# Patient Record
Sex: MALE | Race: WHITE | NOT HISPANIC OR LATINO | URBAN - METROPOLITAN AREA
[De-identification: names, ages, dates, MRNs, and addresses within clinical notes are randomized per-mention and may not be internally consistent; named-entity substitution may affect disease eponyms.]

---

## 2019-08-16 NOTE — PATIENT DISCUSSION
Also, please do not hesitate to call us if you have any concerns not addressed by this information. Please call [phone] and we will do everything we can to help you during this [email or web address]

## 2022-06-06 ENCOUNTER — CONSULTATION/EVALUATION (OUTPATIENT)
Dept: URBAN - METROPOLITAN AREA CLINIC 29 | Facility: CLINIC | Age: 47
End: 2022-06-06

## 2022-06-06 ENCOUNTER — NEW PATIENT (OUTPATIENT)
Dept: URBAN - METROPOLITAN AREA CLINIC 31 | Facility: CLINIC | Age: 47
End: 2022-06-06

## 2022-06-06 DIAGNOSIS — H57.13: ICD-10-CM

## 2022-06-06 DIAGNOSIS — H16.8: ICD-10-CM

## 2022-06-06 PROCEDURE — 99203 OFFICE O/P NEW LOW 30 MIN: CPT

## 2022-06-06 ASSESSMENT — VISUAL ACUITY
OD_CC: CF 1FT
OS_CC: 20/50
OS_CC: 20/30

## 2022-06-07 ENCOUNTER — FOLLOW UP (OUTPATIENT)
Dept: URBAN - METROPOLITAN AREA CLINIC 29 | Facility: CLINIC | Age: 47
End: 2022-06-07

## 2022-06-07 DIAGNOSIS — H16.8: ICD-10-CM

## 2022-06-07 PROCEDURE — 92012 INTRM OPH EXAM EST PATIENT: CPT

## 2022-06-07 ASSESSMENT — VISUAL ACUITY
OS_CC: 20/60
OD_CC: CF 2FT
OS_PH: 20/50+2

## 2022-06-09 ENCOUNTER — FOLLOW UP (OUTPATIENT)
Dept: URBAN - METROPOLITAN AREA CLINIC 34 | Facility: CLINIC | Age: 47
End: 2022-06-09

## 2022-06-09 DIAGNOSIS — H16.8: ICD-10-CM

## 2022-06-09 PROCEDURE — 99213 OFFICE O/P EST LOW 20 MIN: CPT

## 2022-06-09 ASSESSMENT — VISUAL ACUITY
OD_CC: 20/30-2
OS_CC: 20/30

## 2022-06-14 ENCOUNTER — FOLLOW UP (OUTPATIENT)
Dept: URBAN - METROPOLITAN AREA CLINIC 29 | Facility: CLINIC | Age: 47
End: 2022-06-14

## 2022-06-14 DIAGNOSIS — H16.8: ICD-10-CM

## 2022-06-14 PROCEDURE — 92012 INTRM OPH EXAM EST PATIENT: CPT

## 2022-06-14 ASSESSMENT — VISUAL ACUITY
OD_SC: 20/40+2
OS_SC: 20/30-1

## 2022-06-22 ENCOUNTER — FOLLOW UP (OUTPATIENT)
Dept: URBAN - METROPOLITAN AREA CLINIC 29 | Facility: CLINIC | Age: 47
End: 2022-06-22

## 2022-06-22 DIAGNOSIS — H16.8: ICD-10-CM

## 2022-06-22 PROCEDURE — 99213 OFFICE O/P EST LOW 20 MIN: CPT

## 2022-06-22 ASSESSMENT — VISUAL ACUITY
OS_CC: 20/40
OS_PH: 20/25
OD_PH: 20/20
OD_CC: 20/40

## 2022-07-15 ENCOUNTER — FOLLOW UP (OUTPATIENT)
Dept: URBAN - METROPOLITAN AREA CLINIC 29 | Facility: CLINIC | Age: 47
End: 2022-07-15

## 2022-07-15 DIAGNOSIS — H17.9: ICD-10-CM

## 2022-07-15 PROCEDURE — 99213 OFFICE O/P EST LOW 20 MIN: CPT

## 2022-07-15 PROCEDURE — 92025 CPTRIZED CORNEAL TOPOGRAPHY: CPT

## 2022-07-15 ASSESSMENT — VISUAL ACUITY
OS_SC: 20/200
OS_CC: 20/40
OD_SC: 20/200
OD_SC: 20/20
OD_CC: 20/40
OS_SC: 20/20
OD_CC: 20/20-1
OS_CC: 20/20

## 2023-01-09 ENCOUNTER — CONSULTATION/EVALUATION (OUTPATIENT)
Dept: URBAN - METROPOLITAN AREA CLINIC 29 | Facility: CLINIC | Age: 48
End: 2023-01-09

## 2023-01-09 DIAGNOSIS — H52.13: ICD-10-CM

## 2023-01-09 DIAGNOSIS — H17.9: ICD-10-CM

## 2023-01-09 PROCEDURE — 99499LK REFRACTIVE CONSULT/LASIK

## 2023-01-09 PROCEDURE — 76514 ECHO EXAM OF EYE THICKNESS: CPT

## 2023-01-09 PROCEDURE — 92025 CPTRIZED CORNEAL TOPOGRAPHY: CPT

## 2023-01-09 ASSESSMENT — VISUAL ACUITY
OD_SC: 20/100-2
OD_CC: 20/20
OS_SC: 20/200
OS_CC: 20/20

## 2023-01-09 ASSESSMENT — PACHYMETRY
OS_CT_UM: 578
OD_CT_UM: 524

## 2023-01-09 ASSESSMENT — TONOMETRY
OD_IOP_MMHG: 16
OS_IOP_MMHG: 13

## 2023-04-01 ENCOUNTER — POST-OP (OUTPATIENT)
Dept: URBAN - METROPOLITAN AREA CLINIC 29 | Facility: CLINIC | Age: 48
End: 2023-04-01

## 2023-04-01 DIAGNOSIS — Z98.890: ICD-10-CM

## 2023-04-01 DIAGNOSIS — H17.9: ICD-10-CM

## 2023-04-01 PROCEDURE — 6699955 NON-COMANAGED LASIK PO

## 2023-04-01 ASSESSMENT — VISUAL ACUITY
OD_SC: 20/40
OS_SC: 20/30

## 2023-04-05 ENCOUNTER — POST-OP (OUTPATIENT)
Dept: URBAN - METROPOLITAN AREA CLINIC 29 | Facility: CLINIC | Age: 48
End: 2023-04-05

## 2023-04-05 DIAGNOSIS — Z98.890: ICD-10-CM

## 2023-04-05 PROCEDURE — 6699955 NON-COMANAGED LASIK PO

## 2023-04-05 ASSESSMENT — VISUAL ACUITY
OS_SC: 20/20-2
OS_SC: 20/25-2
OD_SC: 20/20-1

## 2023-05-05 ENCOUNTER — POST-OP (OUTPATIENT)
Dept: URBAN - METROPOLITAN AREA CLINIC 29 | Facility: CLINIC | Age: 48
End: 2023-05-05

## 2023-05-05 DIAGNOSIS — Z98.890: ICD-10-CM

## 2023-05-05 PROCEDURE — 66999PO NON CO-MANAGED OTHER SURGERY PO

## 2023-05-05 ASSESSMENT — VISUAL ACUITY
OS_SC: 20/20
OS_SC: 20/50
OD_SC: 20/20-1
OD_SC: 20/70

## 2023-07-11 ENCOUNTER — POST-OP (OUTPATIENT)
Dept: URBAN - METROPOLITAN AREA CLINIC 29 | Facility: CLINIC | Age: 48
End: 2023-07-11

## 2023-07-11 DIAGNOSIS — Z98.890: ICD-10-CM

## 2023-07-11 PROCEDURE — 66999PO NON CO-MANAGED OTHER SURGERY PO

## 2023-07-11 ASSESSMENT — VISUAL ACUITY
OS_SC: 20/25
OD_SC: 20/20
OS_SC: 20/25
OD_SC: 20/60

## 2024-02-27 ENCOUNTER — POST-OP (OUTPATIENT)
Dept: URBAN - METROPOLITAN AREA CLINIC 29 | Facility: CLINIC | Age: 49
End: 2024-02-27

## 2024-02-27 DIAGNOSIS — Z98.890: ICD-10-CM

## 2024-02-27 PROCEDURE — 6699955 NON-COMANAGED LASIK PO

## 2024-02-27 ASSESSMENT — VISUAL ACUITY
OD_SC: J7
OS_SC: J2
OD_SC: 20/20-1
OS_SC: 20/25